# Patient Record
Sex: MALE | Race: WHITE | Employment: FULL TIME | URBAN - METROPOLITAN AREA
[De-identification: names, ages, dates, MRNs, and addresses within clinical notes are randomized per-mention and may not be internally consistent; named-entity substitution may affect disease eponyms.]

---

## 2017-02-24 ENCOUNTER — OFFICE VISIT (OUTPATIENT)
Dept: FAMILY MEDICINE CLINIC | Facility: CLINIC | Age: 37
End: 2017-02-24

## 2017-02-24 VITALS
OXYGEN SATURATION: 98 % | TEMPERATURE: 100 F | HEART RATE: 114 BPM | SYSTOLIC BLOOD PRESSURE: 114 MMHG | RESPIRATION RATE: 20 BRPM | DIASTOLIC BLOOD PRESSURE: 70 MMHG | WEIGHT: 188 LBS | BODY MASS INDEX: 25 KG/M2

## 2017-02-24 DIAGNOSIS — J11.1 FLU SYNDROME: Primary | ICD-10-CM

## 2017-02-24 PROCEDURE — 99213 OFFICE O/P EST LOW 20 MIN: CPT | Performed by: FAMILY MEDICINE

## 2017-02-24 NOTE — PROGRESS NOTES
Court Gregorio is a 29year old male. Patient presents with:  Cough: per pt    HPI:   Sixto Vela presents to the office with complaints of upper respiratory tract infection, having congestion for 2-3 days. He has had a cough and yellow sputum production. negative  LUNGS: clear to percussion and auscultation  ABD: non distended, no masses, bowel sounds present, non tender  EXT: no edema    ASSESSMENT AND PLAN:   Sabrina Suárez is a 29year old male who presents with Viral Syndrome or flu syndrome. Vic Driscoll    PL

## 2018-06-20 ENCOUNTER — OFFICE VISIT (OUTPATIENT)
Dept: FAMILY MEDICINE CLINIC | Facility: CLINIC | Age: 38
End: 2018-06-20

## 2018-06-20 VITALS
BODY MASS INDEX: 24.12 KG/M2 | WEIGHT: 182 LBS | OXYGEN SATURATION: 98 % | DIASTOLIC BLOOD PRESSURE: 60 MMHG | TEMPERATURE: 98 F | SYSTOLIC BLOOD PRESSURE: 100 MMHG | RESPIRATION RATE: 16 BRPM | HEIGHT: 73 IN | HEART RATE: 90 BPM

## 2018-06-20 DIAGNOSIS — J01.00 ACUTE MAXILLARY SINUSITIS, RECURRENCE NOT SPECIFIED: Primary | ICD-10-CM

## 2018-06-20 PROCEDURE — 99213 OFFICE O/P EST LOW 20 MIN: CPT | Performed by: NURSE PRACTITIONER

## 2018-06-20 RX ORDER — AMOXICILLIN AND CLAVULANATE POTASSIUM 875; 125 MG/1; MG/1
1 TABLET, FILM COATED ORAL 2 TIMES DAILY
Qty: 20 TABLET | Refills: 0 | Status: SHIPPED | OUTPATIENT
Start: 2018-06-20 | End: 2018-06-30

## 2018-06-20 NOTE — PROGRESS NOTES
CHIEF COMPLAINT:   Patient presents with:  Sinus Problem      HPI:   Giuliano Taylor is a 40year old male who presents for sinus congestion for  15  days. Symptoms have been very slowly worsening since onset.  Sinus congestion/pain is described as a pres /60 (BP Location: Left arm, Patient Position: Sitting, Cuff Size: adult)   Pulse 90   Temp 97.9 °F (36.6 °C) (Tympanic)   Resp 16   Ht 73\"   Wt 182 lb   SpO2 98%   BMI 24.01 kg/m²   GENERAL: well developed, well nourished,in no apparent distress  SK The sinuses are air-filled spaces within the bones of the face. They connect to the inside of the nose. Sinusitis is an inflammation of the tissue lining the sinus cavity. Sinus inflammation can occur during a cold.  It can also be due to allergies to polle · Do not use nasal rinses or irrigation during an acute sinus infection, unless told to by your health care provider. Rinsing may spread the infection to other sinuses.   · Use acetaminophen or ibuprofen to control pain, unless another pain medicine was pre

## 2018-10-22 ENCOUNTER — OFFICE VISIT (OUTPATIENT)
Dept: FAMILY MEDICINE CLINIC | Facility: CLINIC | Age: 38
End: 2018-10-22

## 2018-10-22 ENCOUNTER — TELEPHONE (OUTPATIENT)
Dept: FAMILY MEDICINE CLINIC | Facility: CLINIC | Age: 38
End: 2018-10-22

## 2018-10-22 VITALS
TEMPERATURE: 99 F | SYSTOLIC BLOOD PRESSURE: 124 MMHG | DIASTOLIC BLOOD PRESSURE: 64 MMHG | RESPIRATION RATE: 16 BRPM | HEART RATE: 74 BPM | BODY MASS INDEX: 24 KG/M2 | WEIGHT: 184 LBS

## 2018-10-22 DIAGNOSIS — M54.50 LEFT-SIDED LOW BACK PAIN WITHOUT SCIATICA, UNSPECIFIED CHRONICITY: ICD-10-CM

## 2018-10-22 DIAGNOSIS — Z23 NEED FOR VACCINATION: Primary | ICD-10-CM

## 2018-10-22 PROCEDURE — 90686 IIV4 VACC NO PRSV 0.5 ML IM: CPT | Performed by: FAMILY MEDICINE

## 2018-10-22 PROCEDURE — 99214 OFFICE O/P EST MOD 30 MIN: CPT | Performed by: FAMILY MEDICINE

## 2018-10-22 PROCEDURE — 90471 IMMUNIZATION ADMIN: CPT | Performed by: FAMILY MEDICINE

## 2018-10-22 RX ORDER — METHYLPREDNISOLONE 4 MG/1
TABLET ORAL
Qty: 1 KIT | Refills: 0 | Status: SHIPPED | OUTPATIENT
Start: 2018-10-22 | End: 2018-12-19

## 2018-10-22 NOTE — PROGRESS NOTES
Court Gregorio is a 40year old male. HPI:   Patient here to discuss back pain. Has been seeing Tina mccray in [place]. Patient has been going to chiro for \"tuneups\" monthly or so years for \"adjustment\" to stay in line.   Nothing anselmo tobacco: Never Used    Alcohol use: Yes      Comment: <6/week    Drug use: No          REVIEW OF SYSTEMS:   GENERAL HEALTH: feels well otherwise  SKIN: denies any unusual skin lesions or rashes  GI: denies abdominal pain; no loss of bowel control  : no l what these are w/ patient)

## 2018-10-23 NOTE — TELEPHONE ENCOUNTER
Spoke with Isrrael Awan from [employer]. States patient has to come in to  films, will have to bring them back. Raina Jacobo advised of this.

## 2018-10-31 ENCOUNTER — TELEPHONE (OUTPATIENT)
Dept: FAMILY MEDICINE CLINIC | Facility: CLINIC | Age: 38
End: 2018-10-31

## 2018-10-31 NOTE — TELEPHONE ENCOUNTER
Per Mattie Soliz she agrees with what the chiro said- she can see a little arthritis of the vertebrae. The space between a few vertabrae is a little narrowed which can be from disc degeneration. No change to the plan from her standpoint.     He can come and pick

## 2018-12-19 ENCOUNTER — OFFICE VISIT (OUTPATIENT)
Dept: FAMILY MEDICINE CLINIC | Facility: CLINIC | Age: 38
End: 2018-12-19

## 2018-12-19 VITALS
DIASTOLIC BLOOD PRESSURE: 80 MMHG | TEMPERATURE: 99 F | BODY MASS INDEX: 25 KG/M2 | RESPIRATION RATE: 14 BRPM | SYSTOLIC BLOOD PRESSURE: 110 MMHG | WEIGHT: 189.19 LBS | HEART RATE: 74 BPM

## 2018-12-19 DIAGNOSIS — G89.29 CHRONIC LEFT-SIDED LOW BACK PAIN WITHOUT SCIATICA: Primary | ICD-10-CM

## 2018-12-19 DIAGNOSIS — R29.898 WEAKNESS OF LEFT LOWER EXTREMITY: ICD-10-CM

## 2018-12-19 DIAGNOSIS — M54.50 CHRONIC LEFT-SIDED LOW BACK PAIN WITHOUT SCIATICA: Primary | ICD-10-CM

## 2018-12-19 PROCEDURE — 99214 OFFICE O/P EST MOD 30 MIN: CPT | Performed by: FAMILY MEDICINE

## 2018-12-19 NOTE — PROGRESS NOTES
Gerhard Oliveira is a 41year old male. HPI:   Patient here to discuss back pain. Prior note:  -------------  Gerhard Oliveira is a 41year old male. HPI:   r    No current outpatient medications on file.      [place] Marcsu [place]ccray [place],though no radicular signs;   -gave HEP for back and iliopsoas muscles  -trial of medrol pack; reviewed possible SEs of steroid including insomnia, jitteriness, HA, upset stomach, irritability, increased appetite, to let me know if any problems or othe Tobacco Use      Smoking status: Never Smoker      Smokeless tobacco: Never Used    Alcohol use: Yes      Comment: <6/week    Drug use: No         REVIEW OF SYSTEMS:   GENERAL HEALTH: feels well otherwise  SKIN: denies any unusual skin lesions or rashes in

## 2019-01-10 ENCOUNTER — HOSPITAL ENCOUNTER (OUTPATIENT)
Dept: MRI IMAGING | Age: 39
Discharge: HOME | End: 2019-01-10
Attending: FAMILY MEDICINE
Payer: COMMERCIAL

## 2019-01-10 DIAGNOSIS — G89.29 CHRONIC LEFT-SIDED LOW BACK PAIN WITHOUT SCIATICA: ICD-10-CM

## 2019-01-10 DIAGNOSIS — M54.50 CHRONIC LEFT-SIDED LOW BACK PAIN WITHOUT SCIATICA: ICD-10-CM

## 2019-01-10 DIAGNOSIS — R29.898 WEAKNESS OF LEFT LOWER EXTREMITY: ICD-10-CM

## 2019-01-10 PROCEDURE — 72148 MRI LUMBAR SPINE W/O DYE: CPT | Performed by: FAMILY MEDICINE

## 2019-07-11 ENCOUNTER — PATIENT MESSAGE (OUTPATIENT)
Dept: FAMILY MEDICINE CLINIC | Facility: CLINIC | Age: 39
End: 2019-07-11

## 2019-07-11 DIAGNOSIS — Z30.09 VASECTOMY EVALUATION: Primary | ICD-10-CM

## 2019-07-11 NOTE — TELEPHONE ENCOUNTER
From: Cecile Luis  To: Kathy Mackey MD  Sent: 7/11/2019 1:52 PM CDT  Subject: Referral Request    Afternoon Dr. Elbert Murry,    I was starting the process of looking into getting a vasectomy. Specifically a no-scalpel procedure if possible.  My insurance p

## 2019-08-09 ENCOUNTER — OFFICE VISIT (OUTPATIENT)
Dept: FAMILY MEDICINE CLINIC | Facility: CLINIC | Age: 39
End: 2019-08-09

## 2019-08-09 VITALS
OXYGEN SATURATION: 98 % | BODY MASS INDEX: 25.05 KG/M2 | DIASTOLIC BLOOD PRESSURE: 66 MMHG | HEIGHT: 73 IN | SYSTOLIC BLOOD PRESSURE: 120 MMHG | HEART RATE: 67 BPM | RESPIRATION RATE: 18 BRPM | TEMPERATURE: 99 F | WEIGHT: 189 LBS

## 2019-08-09 DIAGNOSIS — J02.9 PHARYNGITIS, UNSPECIFIED ETIOLOGY: ICD-10-CM

## 2019-08-09 DIAGNOSIS — Z20.818 EXPOSURE TO STREP THROAT: Primary | ICD-10-CM

## 2019-08-09 LAB — CONTROL LINE PRESENT WITH A CLEAR BACKGROUND (YES/NO): YES YES/NO

## 2019-08-09 PROCEDURE — 99213 OFFICE O/P EST LOW 20 MIN: CPT | Performed by: NURSE PRACTITIONER

## 2019-08-09 PROCEDURE — 87081 CULTURE SCREEN ONLY: CPT | Performed by: NURSE PRACTITIONER

## 2019-08-09 PROCEDURE — 87880 STREP A ASSAY W/OPTIC: CPT | Performed by: NURSE PRACTITIONER

## 2019-08-09 NOTE — PROGRESS NOTES
CHIEF COMPLAINT:   Patient presents with:  Sore Throat: x 1 day       HPI:   Brian Renae is a 41year old male presents to clinic with symptoms of sore throat. Patient has had for 1 days. Symptoms have been consistent since onset.   Patient reports fo Breathing is non labored. CARDIO: RRR without murmur  GI: good BS's,no masses, hepatosplenomegaly, or tenderness on direct palpation  EXTREMITIES: no cyanosis, clubbing or edema  LYMPH: Positive anterior cervical r lymphadenopathy.   No posterior cervical

## 2019-08-09 NOTE — PATIENT INSTRUCTIONS
Self-Care for Sore Throats    Sore throats happen for many reasons, such as colds, allergies, and infections caused by viruses or bacteria. In any case, your throat becomes red and sore.  Your goal for self-care is to reduce your discomfort while giving y · A temperature over 101°F (38.3°C)  · White spots on the throat  · Great difficulty swallowing  · Trouble breathing  · A skin rash  · Recent exposure to someone else with strep bacteria  · Severe hoarseness and swollen glands in the neck or jaw  Date Last

## 2021-05-20 ENCOUNTER — OFFICE VISIT (OUTPATIENT)
Dept: FAMILY MEDICINE CLINIC | Facility: CLINIC | Age: 41
End: 2021-05-20
Payer: COMMERCIAL

## 2021-05-20 VITALS
WEIGHT: 185 LBS | TEMPERATURE: 99 F | RESPIRATION RATE: 16 BRPM | BODY MASS INDEX: 24.52 KG/M2 | HEIGHT: 73 IN | OXYGEN SATURATION: 98 %

## 2021-05-20 DIAGNOSIS — Z11.59 SCREENING FOR VIRAL DISEASE: Primary | ICD-10-CM

## 2021-05-20 DIAGNOSIS — H81.10 BPPV (BENIGN PAROXYSMAL POSITIONAL VERTIGO), UNSPECIFIED LATERALITY: ICD-10-CM

## 2021-05-20 DIAGNOSIS — H69.83 ETD (EUSTACHIAN TUBE DYSFUNCTION), BILATERAL: ICD-10-CM

## 2021-05-20 PROCEDURE — U0002 COVID-19 LAB TEST NON-CDC: HCPCS | Performed by: PHYSICIAN ASSISTANT

## 2021-05-20 PROCEDURE — 99213 OFFICE O/P EST LOW 20 MIN: CPT | Performed by: PHYSICIAN ASSISTANT

## 2021-05-20 PROCEDURE — 3008F BODY MASS INDEX DOCD: CPT | Performed by: PHYSICIAN ASSISTANT

## 2021-05-20 RX ORDER — FLUTICASONE PROPIONATE 50 MCG
2 SPRAY, SUSPENSION (ML) NASAL DAILY
Qty: 1 INHALER | Refills: 0 | Status: SHIPPED | OUTPATIENT
Start: 2021-05-20 | End: 2021-08-22

## 2021-05-20 RX ORDER — MECLIZINE HYDROCHLORIDE 25 MG/1
25 TABLET ORAL 3 TIMES DAILY PRN
Qty: 15 TABLET | Refills: 0 | Status: SHIPPED | OUTPATIENT
Start: 2021-05-20 | End: 2021-08-22

## 2021-05-20 NOTE — PATIENT INSTRUCTIONS
-Meclizine as needed for dizziness  -Flonase daily- 2 sprays each nostril daily.   -Sudafed- before plane. -MUST BE SEEN WITH WORSENING SYMPTOMS- [facility] UNABLE TO RULE OUT OTHER CAUSES OF DIZZINESS THAT MAY BE LIFE THREATENING.       Benign Paroxysmal Posi your healthcare provider may have you move your head and body in certain ways. If you have BPPV, the movements can bring on vertigo. Your provider will also look for abnormal movements of your eyes.  You may have other tests to check your vestibular or nerv ear. Other causes are:  · Trauma  · Improper cleaning of wax from the ear  · Bacterial infection of the mastoid bone (mastoiditis)  · Tumor  · Jaw pain  · Changes in pressure, such as from flying or scuba diving    The pain or discomfort may come and go.  Ashanti Gu nasal sprays from your healthcare provider don't often have such restrictions. · Antihistamines may help if you are also having allergy symptoms. · You may use medicines such as guaifenesin to thin mucus and help with drainage.   Follow-up care  Follow up ,

## 2021-05-20 NOTE — PROGRESS NOTES
CHIEF COMPLAINT:     Patient presents with:  Dizziness    HPI:     Keny Aguilar is a 45year old male presents with complaints of dizziness for the past 3-4 days.  Patient has had symptoms like this before, but typically was associated with sinus infec minor nausea   NEURO: + very minor headache that just started. No lightheadedness.   No seizures, no confusion, no weakness, no abnormal sensation    EXAM:   Temp 98.9 °F (37.2 °C) (Tympanic)   Resp 16   Ht 6' 1\" (1.854 m)   Wt 185 lb (83.9 kg)   SpO2 98% meclizine may make him drowsy. Requested Prescriptions     Signed Prescriptions Disp Refills   • Meclizine HCl 25 MG Oral Tab 15 tablet 0     Sig: Take 1 tablet (25 mg total) by mouth 3 (three) times daily as needed.    • Fluticasone Propionate 50 MCG Diagnosing BPPV  Your primary healthcare provider may diagnose and treat your BPPV. Or you may see an ear, nose, and throat healthcare provider (otolaryngologist).  In some cases, you may see a healthcare provider who focuses on the nervous system (neurolog impair hearing. This is called otitis media with effusion (OME) or serous otitis media. It means there is fluid in the middle ear. It is not the same as acute otitis media, which is often from an infection.   OME can happen when you have a cold if congestio than age 25 who has a fever. It may cause severe liver damage. · Ask your healthcare provider if you may use over-the-counter decongestants such as phenylephrine or pseudoephedrine. Keep in mind they are not always helpful.   · Talk with your healthcare pr

## 2021-08-22 ENCOUNTER — OFFICE VISIT (OUTPATIENT)
Dept: FAMILY MEDICINE CLINIC | Facility: CLINIC | Age: 41
End: 2021-08-22
Payer: COMMERCIAL

## 2021-08-22 VITALS
BODY MASS INDEX: 24.52 KG/M2 | DIASTOLIC BLOOD PRESSURE: 62 MMHG | HEIGHT: 73 IN | WEIGHT: 185 LBS | HEART RATE: 87 BPM | TEMPERATURE: 98 F | OXYGEN SATURATION: 98 % | RESPIRATION RATE: 20 BRPM | SYSTOLIC BLOOD PRESSURE: 110 MMHG

## 2021-08-22 DIAGNOSIS — J06.9 VIRAL UPPER RESPIRATORY TRACT INFECTION: Primary | ICD-10-CM

## 2021-08-22 LAB
OPERATOR ID: NORMAL
RAPID SARS-COV-2 BY PCR: NOT DETECTED

## 2021-08-22 PROCEDURE — 3008F BODY MASS INDEX DOCD: CPT | Performed by: NURSE PRACTITIONER

## 2021-08-22 PROCEDURE — 3078F DIAST BP <80 MM HG: CPT | Performed by: NURSE PRACTITIONER

## 2021-08-22 PROCEDURE — 3074F SYST BP LT 130 MM HG: CPT | Performed by: NURSE PRACTITIONER

## 2021-08-22 PROCEDURE — 99213 OFFICE O/P EST LOW 20 MIN: CPT | Performed by: NURSE PRACTITIONER

## 2021-08-22 PROCEDURE — U0002 COVID-19 LAB TEST NON-CDC: HCPCS | Performed by: NURSE PRACTITIONER

## 2021-08-22 NOTE — PATIENT INSTRUCTIONS
Rapid Covid 19 is negative  Be sure to wear the mask when outside the home  Consider the Covid 19 vaccines      Coronavirus Disease 2019 (COVID-19): Prevention  The best prevention is to have no contact with the SARS-CoV-2 virus, to follow safety precautio school and sporting events. You may be advised not to attend public gatherings.   You will be advised to stay at least 6 feet from others as much as possible. This is called \"social distancing. \" You may be advised to stay at home and isolate yourself as help you if you get sick. · Don't be around people who are sick. · There is no evidence right now that animals spread SARS-CoV-2. But it's always a good idea to wash your hands after touching any animals. Don't touch animals that may be sick.   · Don’t sh the second shot of the 2-dose vaccine. People fully vaccinated:  ? Don't need to wear a mask outdoors except in crowded settings. For example, at an outdoor concert or sporting event.   ? Can visit with other fully vaccinated people indoors without wearing garcía one person at a time. · Consider not having office coffee or tea, or group foods. · Don’t have meals in groups. · Clean work surfaces often with disinfectant.  This includes desk surfaces, photocopier, printer, phones, [employer], fridge d If you develop any symptoms, isolate yourself right away. Call your provider first before going to any clinic or hospital. See the CDC's symptom .   · Your limits are different if you've had COVID-19 in the last 3 months but are fully recovered witho

## 2021-08-22 NOTE — PROGRESS NOTES
CHIEF COMPLAINT:   Patient presents with:  Upper Respiratory Infection      HPI:   Lisset Florez is a 45year old male who presents for upper respiratory symptoms for  2 days. Patient reports slight SOB, no CP. Symptoms have been constant since onset. Rapid SARS-CoV-2 by PCR Not Detected Not Detected    POCT Lot Number [id number]     POCT Expiration Date 11/20/2021     POCT Procedure Control Control Valid Control Valid     ,869          ASSESSMENT AND PLAN:   Bernice Navarrete is a 45 year o 1-dose or the second shot of the 2-dose vaccine. During a pandemic, it's especially important to keep up on recommended vaccines for other illnesses. This is more true if you're at higher risk for severe illness from COVID-19, the flu, or pneumonia.  This clean hands. · Don’t kiss someone who is sick. · If you need to cough or sneeze, do it into a tissue. Then throw the tissue into the trash. If you don't have tissues, cough or sneeze into the bend of your elbow.   · When possible, don't touch \"high-touch possible. If you do attend public gatherings, follow social distancing rules. Don't share food or personal items such as water bottles. · See the CDC's guidance on face masks.  The CDC advises wearing a cloth face mask with two or more layers of washable, Don't shake hands with anyone. · Don't attend in-person meetings, or limit how many you attend. Meet over phone or video if possible. · Don't use other people's desks, phones, equipment, or offices, if possible. · Wash your hands often.  Use soap and giancarlo quarantine. The CDC advises that you quarantine to help prevent the spread of COVID-19 once you've been exposed to someone with the infection.  The CDC still supports quarantine for 14 days after exposure, but they recognize the hardship for many who need t loss of taste or smell, or diarrhea. Don’t go to a healthcare facility before speaking to a healthcare provider. Last modified date: 4/30/2021  Ronald last reviewed this educational content on 4/1/2020  © 7962-4385 [employer].  All rights

## 2021-11-11 ENCOUNTER — HOSPITAL ENCOUNTER (OUTPATIENT)
Age: 41
Discharge: HOME | End: 2021-11-11
Payer: COMMERCIAL

## 2021-11-11 VITALS
OXYGEN SATURATION: 97 % | WEIGHT: 185 LBS | TEMPERATURE: 99 F | BODY MASS INDEX: 24.52 KG/M2 | RESPIRATION RATE: 18 BRPM | HEIGHT: 73 IN | SYSTOLIC BLOOD PRESSURE: 127 MMHG | DIASTOLIC BLOOD PRESSURE: 85 MMHG | HEART RATE: 96 BPM

## 2021-11-11 DIAGNOSIS — U07.1 COVID-19: ICD-10-CM

## 2021-11-11 DIAGNOSIS — R05.9 COUGH: Primary | ICD-10-CM

## 2021-11-11 PROCEDURE — U0002 COVID-19 LAB TEST NON-CDC: HCPCS | Performed by: PHYSICIAN ASSISTANT

## 2021-11-11 PROCEDURE — 99213 OFFICE O/P EST LOW 20 MIN: CPT | Performed by: PHYSICIAN ASSISTANT

## 2021-11-11 NOTE — ED PROVIDER NOTES
Patient Seen in: [facility] [address] [facility]      History   Patient presents with:  Cough/URI    Stated Complaint: coughing/chills /nasal congestion    Subjective:   HPI    Doloreslucas Schaefer is a 71-year-old male who presents for Covid testing today.   He states that aft Cardiovascular: Normal rate, regular rhythm, normal heart sounds and intact distal pulses. Pulmonary/Chest: Effort normal.  Speaking in complete sentences, without difficulty. Lungs are clear to auscultation.   No wheezes, rales or rhonchi appreciat Prescribed:  There are no discharge medications for this patient.